# Patient Record
Sex: FEMALE | Race: WHITE
[De-identification: names, ages, dates, MRNs, and addresses within clinical notes are randomized per-mention and may not be internally consistent; named-entity substitution may affect disease eponyms.]

---

## 2019-03-20 ENCOUNTER — HOSPITAL ENCOUNTER (OUTPATIENT)
Dept: HOSPITAL 92 - SCSRAD | Age: 39
Discharge: HOME | End: 2019-03-20
Attending: OBSTETRICS & GYNECOLOGY
Payer: COMMERCIAL

## 2019-03-20 DIAGNOSIS — Z30.431: Primary | ICD-10-CM

## 2019-03-20 PROCEDURE — 74018 RADEX ABDOMEN 1 VIEW: CPT

## 2019-03-20 NOTE — RAD
FRONTAL VIEW ABDOMEN 

KUB:

 

Date:  03/20/19 

 

INDICATION:

Evaluation for IUD placement. 

 

FINDINGS:

An IUD is not visualized overlying the pelvis. Bowel gas pattern is nonspecific. Osseous structures a
re intact. 

 

IMPRESSION: 

No IUD visualized overlying the pelvis. 

 

 

POS: [facility]